# Patient Record
Sex: FEMALE | Race: WHITE | NOT HISPANIC OR LATINO | Employment: OTHER
[De-identification: names, ages, dates, MRNs, and addresses within clinical notes are randomized per-mention and may not be internally consistent; named-entity substitution may affect disease eponyms.]

---

## 2021-11-03 ENCOUNTER — PRE VISIT (OUTPATIENT)
Dept: OTHER | Age: 71
End: 2021-11-03

## 2021-11-03 ENCOUNTER — TRANSCRIBE ORDERS (OUTPATIENT)
Dept: OTHER | Age: 71
End: 2021-11-03

## 2021-11-03 DIAGNOSIS — J39.8 TRACHEOBRONCHOMALACIA: Primary | ICD-10-CM

## 2021-11-03 NOTE — TELEPHONE ENCOUNTER
Action 11/03@239PM   Action Taken CE UPDATED [facility] IMG FROM 2017 TO PRESENT REQUESTED  CK

## 2021-11-17 NOTE — PROGRESS NOTES
LUNG NODULE & INTERVENTIONAL PULMONARY CLINIC  [facility]     Julita Segura MRN# [id number]   Age: 71 year old YOB: 1950     Reason for Consultation: Tracheobronchomalacia    Requesting Physician: Paty Jensen PA-C  [facility]  [address] [place] [address]       Assessment and Plan:    1.Tracheobronchomalacia  Significant TBM noted on 8/2021 CT chest that was not present on 2017 CT. Air trapping noted bilaterally.  There was initially some concern for relapsing polychondritis, although on review of rheumatology note, patient does not have involvement of enough body parts to meet the diagnostic criteria, but it's not clear if this has been ruled out or what the diagnosis is for which she is being treated with prednisone. She has gained weight in the past year which is contributing as well to her shortness of breath and TBM, and she is motivated to try to lose it.   --Will discuss at multidisciplinary conference (that includes thoracic surgery and IP) regarding next steps    Total time: 45 minutes (incudes prep time, reviewing outside records, visit time, and post visit work on the day of the encounter).        Gabby Guzman MD  Interventional Pulmonology  Department of Pulmonary, Allergy, Critical Care and Sleep Medicine   [facility]    Addendum 11/19: Discussed at multidisciplinary conference with my other interventional pulmonary colleagues and thoracic surgery. Given high BMI, patient would not be a surgical candidate for tracheobronchoplasty (the treatment for TBM), hence there would not be an indication for stents, as they are only used a trial to assess if there is any benefit prior to surgery and not a long term solution. She would have to lose a significant amount of weight (have a BMI closer to 35) before she can be re-evaluated for surgery and weight loss itself would have benefit for her TBM. Recommend she continue use of  CPAP at night and can even be used during day if she is feeling significantly short of breath. Patient will be contacted with these recommendations.          History:     Julita Segura is a 71 year old female with sig h/o for MARIA C and HTN who is here for evaluation of tracheobronchomalacia.     Seeing rheumatology for elevated CK and aldolase and evaluation of relapsing polychondritis due to reports of tenderness of left auricle and ocular issues. On evaluation with rheumatology, she does not have clear involvement of her ears, nose, joints, eye, or sclera. She also reported upper proximal muscle weakness, but EMG study of this area was normal. She was started on prednisone 60mg daily for 4 weeks, then tapered to 40mg until her appointment in January. Underwent biopsy of right thigh muscle that was negative for myopathy but had been on 10 days of prednisone at that point. Since starting prednisone, her joint pain has improved, but no difference in her breathing. She has noticed more jitteriness and increased appetite with the prednisone.     SOB since 2017. She has been using oxygen intermittently since then, but now is consistently using 3-4L during the day and 3L at night with her CPAP, both of which she reports having undergone oxygen titration for. Is using CPAP more consistently now and has noticed increased wakefulness during the day.     Is being treated for asthma with symbicort, montelukast, spiriva (started 6 weeks ago). Is able to walk about a block with her walker. Does not use any stairs. Has intermittent cough.     This past year has been hard on the patient with the death of two close family members. She has gained weight as a result of stress eating and has struggled with her weight on and off for many years.     - My interpretation of the images relevant for this visit includes: Significant airway collapse in trachea and bilateral mainstem. Air trapping present.   - My interpretation of the PFT's  "relevant for this visit includes:7/2021 Reduced FEV1 (0.86L 41%) and FVC (0.99L 35%) with normal FEV1/FVC ratio (87%), suggestive of restriction, but no lung volumes to confirm.     Other active medical problems include:   - Left carpal tunnel by EMG  - Diagnosed with Bell's Palsy 2013 and receives b1euqli botox injections  - History of Whooping cough 1999         Past Medical History:      MARIA C  HTN  Undergoing rheumatologic workup         Past Surgical History:    Bilateral knee replacements         Social History:     Lives in [place] (6 hour drive to the [facility]). Former smoker, quit in 1971. Smoked 1/4 pack for 6 years.        Family History:   Family history of RA and lupus.         Allergies:      Active Allergy Reactions Severity Noted Date Comments   Meperidine Rash Medium       Phenytoin Rash Medium       Metronidazole Rash Medium 09/03/2014     Gabapentin Enacarbil Shortness of breath Medium       Simvastatin Rash High       Sulfa Drugs Rash Medium              Medications:     Reviewed in CareEverywhere         Physical Exam:   BP (!) 148/82   Pulse 91   Temp 98.1  F (36.7  C) (Oral)   Resp 20   Ht 1.573 m (5' 1.93\")   Wt 121.7 kg (268 lb 6.4 oz)   SpO2 98%   BMI 49.20 kg/m    Wt Readings from Last 4 Encounters:   No data found for Wt     General: Well appearing, nonlabored breathing  HEENT: Slightly drooping of left face from Bell's palsy  Heart: Normal rate, regular rhythm  Lungs: Clear to ascultation bilaterally  Neuro: Answering questions appropriately  Psych: Normal affect     Imaging/Lab Data   All laboratory and imaging data reviewed.           Pre-Bronch    Post-Bronch                                    Pred  Actual %Pred  Actual %Chng   SPIROMETRY   FVC (L)                          2.76   0.99     35   0.62    -37   FEV1 (L)                         2.08   0.86     41   0.57    -34   FEV1/FVC (%)                  76     87    115     92      4            "

## 2021-11-18 ENCOUNTER — OFFICE VISIT (OUTPATIENT)
Dept: PULMONOLOGY | Facility: CLINIC | Age: 71
End: 2021-11-18
Attending: PHYSICIAN ASSISTANT
Payer: MEDICARE

## 2021-11-18 VITALS
WEIGHT: 268.4 LBS | HEIGHT: 62 IN | BODY MASS INDEX: 49.39 KG/M2 | HEART RATE: 91 BPM | RESPIRATION RATE: 20 BRPM | TEMPERATURE: 98.1 F | DIASTOLIC BLOOD PRESSURE: 82 MMHG | OXYGEN SATURATION: 98 % | SYSTOLIC BLOOD PRESSURE: 148 MMHG

## 2021-11-18 DIAGNOSIS — J39.8 TRACHEOBRONCHOMALACIA: ICD-10-CM

## 2021-11-18 PROCEDURE — 99204 OFFICE O/P NEW MOD 45 MIN: CPT | Performed by: STUDENT IN AN ORGANIZED HEALTH CARE EDUCATION/TRAINING PROGRAM

## 2021-11-18 PROCEDURE — G0463 HOSPITAL OUTPT CLINIC VISIT: HCPCS

## 2021-11-18 RX ORDER — PRAVASTATIN SODIUM 20 MG
1 TABLET ORAL DAILY
COMMUNITY
Start: 2021-09-08

## 2021-11-18 RX ORDER — BUDESONIDE AND FORMOTEROL FUMARATE DIHYDRATE 160; 4.5 UG/1; UG/1
AEROSOL RESPIRATORY (INHALATION)
COMMUNITY
Start: 2020-11-16

## 2021-11-18 RX ORDER — CLOBETASOL PROPIONATE 0.5 MG/G
OINTMENT TOPICAL
COMMUNITY
Start: 2020-10-26

## 2021-11-18 RX ORDER — HYDROCODONE BITARTRATE AND ACETAMINOPHEN 7.5; 325 MG/1; MG/1
TABLET ORAL
COMMUNITY

## 2021-11-18 RX ORDER — AMOXICILLIN 500 MG/1
CAPSULE ORAL
COMMUNITY

## 2021-11-18 RX ORDER — DULAGLUTIDE 0.75 MG/.5ML
0.75 INJECTION, SOLUTION SUBCUTANEOUS
COMMUNITY
Start: 2021-11-16

## 2021-11-18 RX ORDER — RIZATRIPTAN BENZOATE 5 MG/1
TABLET ORAL
COMMUNITY
Start: 2021-04-14

## 2021-11-18 RX ORDER — MONTELUKAST SODIUM 10 MG/1
1 TABLET ORAL AT BEDTIME
COMMUNITY
Start: 2021-09-08

## 2021-11-18 RX ORDER — SIMVASTATIN 10 MG
TABLET ORAL
COMMUNITY

## 2021-11-18 RX ORDER — EZETIMIBE 10 MG/1
TABLET ORAL
COMMUNITY

## 2021-11-18 RX ORDER — POLYMYXIN B SULFATE AND TRIMETHOPRIM 1; 10000 MG/ML; [USP'U]/ML
SOLUTION OPHTHALMIC
COMMUNITY

## 2021-11-18 RX ORDER — ALBUTEROL SULFATE 0.83 MG/ML
2.5 SOLUTION RESPIRATORY (INHALATION)
COMMUNITY
Start: 2020-04-30

## 2021-11-18 RX ORDER — LEVETIRACETAM 500 MG/1
TABLET ORAL
COMMUNITY

## 2021-11-18 RX ORDER — HYDROCORTISONE 25 MG/G
CREAM TOPICAL
COMMUNITY
Start: 2021-06-01

## 2021-11-18 RX ORDER — DULOXETIN HYDROCHLORIDE 30 MG/1
CAPSULE, DELAYED RELEASE ORAL
COMMUNITY
Start: 2021-10-29

## 2021-11-18 RX ORDER — LOSARTAN POTASSIUM 25 MG/1
1 TABLET ORAL DAILY
COMMUNITY
Start: 2021-01-13

## 2021-11-18 RX ORDER — CHLORAL HYDRATE 500 MG
1000 CAPSULE ORAL
COMMUNITY

## 2021-11-18 RX ORDER — PREDNISOLONE ACETATE 10 MG/ML
SUSPENSION/ DROPS OPHTHALMIC
COMMUNITY

## 2021-11-18 RX ORDER — HALOBETASOL PROPIONATE 0.05 %
OINTMENT (GRAM) TOPICAL
COMMUNITY
Start: 2020-08-03

## 2021-11-18 RX ORDER — OXYBUTYNIN CHLORIDE 5 MG/1
TABLET, EXTENDED RELEASE ORAL
COMMUNITY
Start: 2020-10-15

## 2021-11-18 RX ORDER — ZOLPIDEM TARTRATE 5 MG/1
TABLET ORAL
COMMUNITY
Start: 2021-10-05

## 2021-11-18 RX ORDER — PREGABALIN 50 MG/1
CAPSULE ORAL
COMMUNITY

## 2021-11-18 RX ORDER — ESOMEPRAZOLE MAGNESIUM 40 MG/1
40 CAPSULE, DELAYED RELEASE ORAL
COMMUNITY
Start: 2020-07-14

## 2021-11-18 RX ORDER — BENZONATATE 200 MG/1
CAPSULE ORAL
COMMUNITY
Start: 2021-01-18

## 2021-11-18 RX ORDER — OSELTAMIVIR PHOSPHATE 75 MG/1
CAPSULE ORAL
COMMUNITY

## 2021-11-18 RX ORDER — CIPROFLOXACIN 250 MG/1
TABLET, FILM COATED ORAL
COMMUNITY

## 2021-11-18 RX ORDER — DOXYCYCLINE HYCLATE 100 MG
100 TABLET ORAL
COMMUNITY
Start: 2021-11-11

## 2021-11-18 RX ORDER — HYDROCODONE BITARTRATE AND IBUPROFEN 7.5; 2 MG/1; MG/1
1 TABLET, FILM COATED ORAL
COMMUNITY
Start: 2021-11-11

## 2021-11-18 RX ORDER — PANTOPRAZOLE SODIUM 40 MG/1
TABLET, DELAYED RELEASE ORAL
COMMUNITY

## 2021-11-18 RX ORDER — CYCLOBENZAPRINE HCL 10 MG
TABLET ORAL
COMMUNITY
Start: 2021-11-16

## 2021-11-18 RX ORDER — AZELASTINE 1 MG/ML
2 SPRAY, METERED NASAL
COMMUNITY
Start: 2021-11-09

## 2021-11-18 RX ORDER — FUROSEMIDE 20 MG
TABLET ORAL
COMMUNITY

## 2021-11-18 RX ORDER — OLOPATADINE HYDROCHLORIDE 2 MG/ML
1 SOLUTION/ DROPS OPHTHALMIC
COMMUNITY
Start: 2021-05-06 | End: 2022-05-11

## 2021-11-18 RX ORDER — LANCETS
1 EACH MISCELLANEOUS
COMMUNITY
Start: 2021-07-22

## 2021-11-18 RX ORDER — CALCIUM CITRATE/VITAMIN D3 200MG-6.25
1 TABLET ORAL
COMMUNITY
Start: 2021-07-22

## 2021-11-18 RX ORDER — LEVOTHYROXINE SODIUM 175 UG/1
TABLET ORAL
COMMUNITY
Start: 2021-08-30

## 2021-11-18 RX ORDER — PREDNISONE 20 MG/1
TABLET ORAL
COMMUNITY
Start: 2021-10-15

## 2021-11-18 RX ORDER — HYDROCORTISONE 2.5 %
CREAM (GRAM) TOPICAL
COMMUNITY
Start: 2021-06-01

## 2021-11-18 RX ORDER — PREGABALIN 100 MG/1
CAPSULE ORAL
COMMUNITY
Start: 2021-04-20

## 2021-11-18 RX ORDER — LORAZEPAM 0.5 MG/1
TABLET ORAL
COMMUNITY

## 2021-11-18 RX ORDER — LEVALBUTEROL INHALATION SOLUTION 0.63 MG/3ML
SOLUTION RESPIRATORY (INHALATION)
COMMUNITY
Start: 2020-11-28

## 2021-11-18 RX ORDER — AZITHROMYCIN 500 MG/1
TABLET, FILM COATED ORAL
COMMUNITY

## 2021-11-18 ASSESSMENT — PAIN SCALES - GENERAL: PAINLEVEL: MODERATE PAIN (5)

## 2021-11-18 ASSESSMENT — MIFFLIN-ST. JEOR: SCORE: 1689.57

## 2021-11-18 NOTE — NURSING NOTE
"Oncology Rooming Note    November 18, 2021 7:37 AM   Julita Segura is a 71 year old female who presents for:    Chief Complaint   Patient presents with     Oncology Clinic Visit     Tracheobronchomalacia      Initial Vitals: BP (!) 148/82   Pulse 91   Temp 98.1  F (36.7  C) (Oral)   Resp 20   Ht 1.573 m (5' 1.93\")   Wt 121.7 kg (268 lb 6.4 oz)   SpO2 98%   BMI 49.20 kg/m   Estimated body mass index is 49.2 kg/m  as calculated from the following:    Height as of this encounter: 1.573 m (5' 1.93\").    Weight as of this encounter: 121.7 kg (268 lb 6.4 oz). Body surface area is 2.31 meters squared.  Moderate Pain (5) Comment: Data Unavailable   No LMP recorded. Patient is postmenopausal.  Allergies reviewed: Yes  Medications reviewed: Yes    Medications: Medication refills not needed today.  Pharmacy name entered into [employer]: [facility] - [place], [place] - [address]    Clinical concerns: New Patient       Estefania Ayoub LPN                      "

## 2021-11-18 NOTE — TELEPHONE ENCOUNTER
Pulmonary Nodule Conference      Patient Name: Julita Segura    Reason for conference discussion (brief overview):   71 year old female with possible asthma on 3 L O2 seen in clinic yesterday by Dr. Guzman for TBM.  Has severe TBM on 8/2021 CT of chest.  Undergoing workup for possible rheumatologic condition (condition unclear) and now on prednisone.  BMI is 49.  Reduced FEV1 and FVC.  Progressive SOB with intermittent cough.    Specific Question:  Would this patient be a candidate for stent trial/surgery?    Pertinent Histology:      Referring Physician: Dr. Gabby Guzman    The patient's case was presented at the multidisciplinary conference for the above noted reason.  There was a consensus recommendation for the following actions:     Due to patient's multiple comorbidities and body habitus she is not a good candidate for surgery.       Case Lead:  Bridgett Garcia RN    Interventional Radiology Staff Present: Joon Navarro MD

## 2021-11-19 ENCOUNTER — TEAM CONFERENCE (OUTPATIENT)
Dept: PULMONOLOGY | Facility: CLINIC | Age: 71
End: 2021-11-19
Payer: MEDICARE

## 2021-12-12 ENCOUNTER — HEALTH MAINTENANCE LETTER (OUTPATIENT)
Age: 71
End: 2021-12-12

## 2022-10-03 ENCOUNTER — HEALTH MAINTENANCE LETTER (OUTPATIENT)
Age: 72
End: 2022-10-03

## 2023-02-11 ENCOUNTER — HEALTH MAINTENANCE LETTER (OUTPATIENT)
Age: 73
End: 2023-02-11

## 2023-10-22 ENCOUNTER — HEALTH MAINTENANCE LETTER (OUTPATIENT)
Age: 73
End: 2023-10-22

## 2024-03-09 ENCOUNTER — HEALTH MAINTENANCE LETTER (OUTPATIENT)
Age: 74
End: 2024-03-09